# Patient Record
(demographics unavailable — no encounter records)

---

## 2025-05-12 NOTE — HEALTH RISK ASSESSMENT
[Monthly or less (1 pt)] : Monthly or less (1 point) [1 or 2 (0 pts)] : 1 or 2 (0 points) [Never (0 pts)] : Never (0 points) [No] : In the past 12 months have you used drugs other than those required for medical reasons? No [0] : 2) Feeling down, depressed, or hopeless: Not at all (0) [PHQ-2 Negative - No further assessment needed] : PHQ-2 Negative - No further assessment needed [Yes] : Reviewed medication list for presence of high-risk medications. [Opioids] : opioids [Former] : Former [15-19] : 15-19 [> 15 Years] : > 15 Years [NO] : No [Patient declined Low Dose CT Scan] : Patient declined Low Dose CT Scan [Patient declined Retinal Exam] : Patient declined Retinal Exam. [HIV test declined] : HIV test declined [Hepatitis C test declined] : Hepatitis C test declined [Audit-CScore] : 1 [HTT4Okeax] : 0

## 2025-05-12 NOTE — PHYSICAL EXAM
[Normal] : normal rate, regular rhythm, normal S1 and S2 and no murmur heard [de-identified] : mildly boggy nasal turbinates

## 2025-05-12 NOTE — HISTORY OF PRESENT ILLNESS
[FreeTextEntry1] : physical exam.  [de-identified] : Mr. GABBIE AGARWAL is a 75 year male with a PMH of IDDM2, BPH, JOSE DE JESUS who presents today for follow-up of chronic medical conditions and physical exam. Patient denies fever, cough SOB. No other complaints at this time.       He is requesting refill of sildenafil to be sent to [facility]

## 2025-05-12 NOTE — PLAN
[FreeTextEntry1] : In regards to patients Physical exam, routine blood work drawn, will review results with patient.  Chronic allergic rhinitis- patient referred to ENT.   DM- patient currently takes Lantus 28 units twice daily , Jardiance 25 mg daily, metformin 1000 mg twice daily. lifestyle modifications recommended, will retest HBA1C today and in 3 months   For BPH, he is maintained on tamsulosin 0.8 mg nightly and finasteride  Prior to appointment and during encounter with patient extensive medical records were reviewed including but not limited to, Hospital records, out patient records, laboratory data and microbiology data In addition extensive time was also spent in reviewing diagnostic studies.   Total encounter total time 30 mins >50% of time spent counseling/coordinating care  Counseling included abnormal lab results, differential diagnoses, treatment options, risks and benefits, lifestyle changes, current condition, medications, and dose adjustments.  The patient was interactive, attentive, asked questions, and verbalized understanding

## 2025-05-19 NOTE — PHYSICAL EXAM
[de-identified] : mild ITH and nasal congestion present [Midline] : trachea located in midline position [Normal] : no neck adenopathy

## 2025-05-19 NOTE — ASSESSMENT
[FreeTextEntry1] : Rhinitis with dependent worsening.  Continue Flonase and Nasal saline rinses.  Add Astelin in 3-4 weeks if no improvement. Seek attention for epistaxis, nasal discharge, facial pain/pressure, fever, chills, headache. Followup 2 months

## 2025-05-19 NOTE — HISTORY OF PRESENT ILLNESS
[de-identified] : 76 year male comes in today with nasal congestion.  It is mostly at night when laying down.  Has to mouth breathe at night and notes PND.  Denies purulent rhinorrhea, headaches, infections or fever.  Just started only 2 days of Flonase.  Denies CT scan.